# Patient Record
Sex: MALE | Race: WHITE | Employment: STUDENT | URBAN - METROPOLITAN AREA
[De-identification: names, ages, dates, MRNs, and addresses within clinical notes are randomized per-mention and may not be internally consistent; named-entity substitution may affect disease eponyms.]

---
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
## 2021-06-30 ENCOUNTER — OFFICE VISIT (OUTPATIENT)
Dept: FAMILY MEDICINE CLINIC | Facility: CLINIC | Age: 21
End: 2021-06-30
Payer: COMMERCIAL

## 2021-06-30 VITALS
RESPIRATION RATE: 18 BRPM | OXYGEN SATURATION: 99 % | SYSTOLIC BLOOD PRESSURE: 118 MMHG | TEMPERATURE: 98 F | HEIGHT: 70 IN | DIASTOLIC BLOOD PRESSURE: 82 MMHG | HEART RATE: 88 BPM | WEIGHT: 212 LBS | BODY MASS INDEX: 30.35 KG/M2

## 2021-06-30 DIAGNOSIS — Z00.00 ANNUAL PHYSICAL EXAM: Primary | ICD-10-CM

## 2021-06-30 DIAGNOSIS — D22.9 ATYPICAL MOLE: ICD-10-CM

## 2021-06-30 PROCEDURE — 80061 LIPID PANEL: CPT | Performed by: FAMILY MEDICINE

## 2021-06-30 PROCEDURE — 3074F SYST BP LT 130 MM HG: CPT | Performed by: FAMILY MEDICINE

## 2021-06-30 PROCEDURE — 85025 COMPLETE CBC W/AUTO DIFF WBC: CPT | Performed by: FAMILY MEDICINE

## 2021-06-30 PROCEDURE — 3008F BODY MASS INDEX DOCD: CPT | Performed by: FAMILY MEDICINE

## 2021-06-30 PROCEDURE — 3079F DIAST BP 80-89 MM HG: CPT | Performed by: FAMILY MEDICINE

## 2021-06-30 PROCEDURE — 84443 ASSAY THYROID STIM HORMONE: CPT | Performed by: FAMILY MEDICINE

## 2021-06-30 PROCEDURE — 99395 PREV VISIT EST AGE 18-39: CPT | Performed by: FAMILY MEDICINE

## 2021-06-30 PROCEDURE — 80053 COMPREHEN METABOLIC PANEL: CPT | Performed by: FAMILY MEDICINE

## 2021-06-30 NOTE — PATIENT INSTRUCTIONS
Exercise for a Healthier Heart     Exercise with a friend. When activity is fun, you're more likely to stick with it. You may wonder how you can improve the health of your heart. If you’re thinking about exercise, you’re on the right track.  You don’t n you enjoy. Walking is one of the easiest things you can do. You can also try swimming, riding a bike, dancing, or taking an exercise class.   Stop exercising and call your doctor if you:  · Have chest pain or feel dizzy or lightheaded  · Feel burning, tight molasses. Cut your usual amount by half. · Use non-sugar sweeteners. Stevia, aspartame, and sucralose can satisfy a sweet tooth without adding calories. · Swap out sugar-filled soda and other drinks. Buy sugar-free or low-calorie beverages.  Remember page your food with herbs and flavorings. Try lemon, garlic, and onion, or salt-free herb seasonings. · Limit convenience foods, such as boxed or canned foods and restaurant food. · Read food labels and choose lower-sodium options. Step 3.  Limit sugar  ·

## 2021-06-30 NOTE — PROGRESS NOTES
Patient presents with:  Physical       HPI:    Patient ID: Christine Dorado is a 23year old male. HPI   No concerns today. He has moles over his upper chest back and both upper extremity since many years. No specific change noted.     No medical illnes distension. Palpations: Abdomen is soft. Tenderness: There is no abdominal tenderness. There is no guarding or rebound. Skin:     General: Skin is warm. Capillary Refill: Capillary refill takes less than 2 seconds.       Comments: Multiple

## 2021-07-01 ENCOUNTER — TELEPHONE (OUTPATIENT)
Dept: FAMILY MEDICINE CLINIC | Facility: CLINIC | Age: 21
End: 2021-07-01

## 2021-07-01 NOTE — TELEPHONE ENCOUNTER
----- Message from Mecca Curtis MD sent at 6/30/2021  6:49 PM CDT -----  Labs look stable. Just one of his liver enz is elevated. I recommend if he can avoid alcohol and also try to lose weight with some diet and exercise.  We will recheck it in 6 months

## 2021-07-06 NOTE — TELEPHONE ENCOUNTER
Delivery Read From Message Type Attachments Subject   7/6/2021 10:42 AM Yanelis Khan RN Patient Medical Advice Request  test results

## 2021-07-24 ENCOUNTER — IMMUNIZATION (OUTPATIENT)
Dept: LAB | Facility: HOSPITAL | Age: 21
End: 2021-07-24
Attending: EMERGENCY MEDICINE
Payer: COMMERCIAL

## 2021-07-24 DIAGNOSIS — Z23 NEED FOR VACCINATION: Primary | ICD-10-CM

## 2021-07-24 PROCEDURE — 0001A SARSCOV2 VAC 30MCG/0.3ML IM: CPT

## 2021-08-14 ENCOUNTER — IMMUNIZATION (OUTPATIENT)
Dept: LAB | Facility: HOSPITAL | Age: 21
End: 2021-08-14
Attending: EMERGENCY MEDICINE
Payer: COMMERCIAL

## 2021-08-14 DIAGNOSIS — Z23 NEED FOR VACCINATION: Primary | ICD-10-CM

## 2021-08-14 PROCEDURE — 0002A SARSCOV2 VAC 30MCG/0.3ML IM: CPT

## 2022-01-04 ENCOUNTER — TELEPHONE (OUTPATIENT)
Dept: FAMILY MEDICINE CLINIC | Facility: CLINIC | Age: 22
End: 2022-01-04

## 2022-01-04 DIAGNOSIS — R74.8 ELEVATED LIVER ENZYMES: Primary | ICD-10-CM

## 2022-01-05 ENCOUNTER — TELEPHONE (OUTPATIENT)
Dept: FAMILY MEDICINE CLINIC | Facility: CLINIC | Age: 22
End: 2022-01-05

## 2022-04-13 ENCOUNTER — NURSE ONLY (OUTPATIENT)
Dept: FAMILY MEDICINE CLINIC | Facility: CLINIC | Age: 22
End: 2022-04-13
Payer: COMMERCIAL

## 2022-04-13 DIAGNOSIS — R74.8 ELEVATED LIVER ENZYMES: ICD-10-CM

## 2022-04-13 LAB
ALBUMIN SERPL-MCNC: 4.2 G/DL (ref 3.4–5)
ALP LIVER SERPL-CCNC: 123 U/L
ALT SERPL-CCNC: 96 U/L
AST SERPL-CCNC: 32 U/L (ref 15–37)
BILIRUB DIRECT SERPL-MCNC: 0.2 MG/DL (ref 0–0.2)
BILIRUB SERPL-MCNC: 0.6 MG/DL (ref 0.1–2)
PROT SERPL-MCNC: 7.4 G/DL (ref 6.4–8.2)

## 2022-04-13 PROCEDURE — 80076 HEPATIC FUNCTION PANEL: CPT | Performed by: FAMILY MEDICINE

## 2022-04-13 NOTE — PROGRESS NOTES
Sharifa Riojas presents today for nurse visit. Labs ordered by Dr Jesse Carrasquillo. 1 gold drawn from left ac area with 1 attempt with straight needle. Patient tolerated well. Left office in stable condition.

## 2022-04-14 ENCOUNTER — TELEPHONE (OUTPATIENT)
Dept: FAMILY MEDICINE CLINIC | Facility: CLINIC | Age: 22
End: 2022-04-14

## 2022-04-15 ENCOUNTER — PATIENT MESSAGE (OUTPATIENT)
Dept: FAMILY MEDICINE CLINIC | Facility: CLINIC | Age: 22
End: 2022-04-15

## 2023-11-28 ENCOUNTER — OFFICE VISIT (OUTPATIENT)
Dept: FAMILY MEDICINE CLINIC | Facility: CLINIC | Age: 23
End: 2023-11-28
Payer: COMMERCIAL

## 2023-11-28 VITALS
OXYGEN SATURATION: 98 % | TEMPERATURE: 98 F | WEIGHT: 231 LBS | HEART RATE: 116 BPM | HEIGHT: 70 IN | BODY MASS INDEX: 33.07 KG/M2 | RESPIRATION RATE: 18 BRPM | DIASTOLIC BLOOD PRESSURE: 80 MMHG | SYSTOLIC BLOOD PRESSURE: 110 MMHG

## 2023-11-28 DIAGNOSIS — J02.9 SORE THROAT: ICD-10-CM

## 2023-11-28 DIAGNOSIS — J02.0 STREP PHARYNGITIS: Primary | ICD-10-CM

## 2023-11-28 LAB
CONTROL LINE PRESENT WITH A CLEAR BACKGROUND (YES/NO): YES YES/NO
KIT LOT #: 6663 NUMERIC

## 2023-11-28 PROCEDURE — 3008F BODY MASS INDEX DOCD: CPT | Performed by: FAMILY MEDICINE

## 2023-11-28 PROCEDURE — 87880 STREP A ASSAY W/OPTIC: CPT | Performed by: FAMILY MEDICINE

## 2023-11-28 PROCEDURE — 3074F SYST BP LT 130 MM HG: CPT | Performed by: FAMILY MEDICINE

## 2023-11-28 PROCEDURE — 3079F DIAST BP 80-89 MM HG: CPT | Performed by: FAMILY MEDICINE

## 2023-11-28 PROCEDURE — 99213 OFFICE O/P EST LOW 20 MIN: CPT | Performed by: FAMILY MEDICINE

## 2023-11-28 RX ORDER — AMOXICILLIN AND CLAVULANATE POTASSIUM 875; 125 MG/1; MG/1
1 TABLET, FILM COATED ORAL 2 TIMES DAILY
Qty: 20 TABLET | Refills: 0 | Status: SHIPPED | OUTPATIENT
Start: 2023-11-28 | End: 2023-12-08

## 2025-02-10 ENCOUNTER — LAB ENCOUNTER (OUTPATIENT)
Dept: LAB | Age: 25
End: 2025-02-10
Payer: COMMERCIAL

## 2025-02-10 ENCOUNTER — OFFICE VISIT (OUTPATIENT)
Dept: FAMILY MEDICINE CLINIC | Facility: CLINIC | Age: 25
End: 2025-02-10
Payer: COMMERCIAL

## 2025-02-10 VITALS
RESPIRATION RATE: 16 BRPM | WEIGHT: 230.81 LBS | OXYGEN SATURATION: 98 % | TEMPERATURE: 98 F | DIASTOLIC BLOOD PRESSURE: 90 MMHG | HEIGHT: 70.28 IN | BODY MASS INDEX: 32.67 KG/M2 | SYSTOLIC BLOOD PRESSURE: 126 MMHG | HEART RATE: 75 BPM

## 2025-02-10 DIAGNOSIS — Z00.00 ANNUAL PHYSICAL EXAM: ICD-10-CM

## 2025-02-10 DIAGNOSIS — Z00.00 ANNUAL PHYSICAL EXAM: Primary | ICD-10-CM

## 2025-02-10 DIAGNOSIS — Z28.21 VACCINATION DECLINED: ICD-10-CM

## 2025-02-10 DIAGNOSIS — R00.2 HEART PALPITATIONS: ICD-10-CM

## 2025-02-10 LAB
ALBUMIN SERPL-MCNC: 4.9 G/DL (ref 3.2–4.8)
ALBUMIN/GLOB SERPL: 1.8 {RATIO} (ref 1–2)
ALP LIVER SERPL-CCNC: 94 U/L
ALT SERPL-CCNC: 94 U/L
ANION GAP SERPL CALC-SCNC: 7 MMOL/L (ref 0–18)
AST SERPL-CCNC: 41 U/L (ref ?–34)
BASOPHILS # BLD AUTO: 0.03 X10(3) UL (ref 0–0.2)
BASOPHILS NFR BLD AUTO: 0.6 %
BILIRUB SERPL-MCNC: 0.7 MG/DL (ref 0.3–1.2)
BUN BLD-MCNC: 11 MG/DL (ref 9–23)
CALCIUM BLD-MCNC: 10.1 MG/DL (ref 8.7–10.6)
CHLORIDE SERPL-SCNC: 106 MMOL/L (ref 98–112)
CHOLEST SERPL-MCNC: 176 MG/DL (ref ?–200)
CO2 SERPL-SCNC: 29 MMOL/L (ref 21–32)
CREAT BLD-MCNC: 1.08 MG/DL
EGFRCR SERPLBLD CKD-EPI 2021: 98 ML/MIN/1.73M2 (ref 60–?)
EOSINOPHIL # BLD AUTO: 0.05 X10(3) UL (ref 0–0.7)
EOSINOPHIL NFR BLD AUTO: 1 %
ERYTHROCYTE [DISTWIDTH] IN BLOOD BY AUTOMATED COUNT: 12.4 %
EST. AVERAGE GLUCOSE BLD GHB EST-MCNC: 94 MG/DL (ref 68–126)
FASTING PATIENT LIPID ANSWER: YES
FASTING STATUS PATIENT QL REPORTED: YES
GLOBULIN PLAS-MCNC: 2.7 G/DL (ref 2–3.5)
GLUCOSE BLD-MCNC: 92 MG/DL (ref 70–99)
HBA1C MFR BLD: 4.9 % (ref ?–5.7)
HCT VFR BLD AUTO: 51.2 %
HCV AB SERPL QL IA: NONREACTIVE
HDLC SERPL-MCNC: 42 MG/DL (ref 40–59)
HGB BLD-MCNC: 17.8 G/DL
IMM GRANULOCYTES # BLD AUTO: 0.02 X10(3) UL (ref 0–1)
IMM GRANULOCYTES NFR BLD: 0.4 %
LDLC SERPL CALC-MCNC: 115 MG/DL (ref ?–100)
LYMPHOCYTES # BLD AUTO: 1.82 X10(3) UL (ref 1–4)
LYMPHOCYTES NFR BLD AUTO: 37.1 %
MCH RBC QN AUTO: 31 PG (ref 26–34)
MCHC RBC AUTO-ENTMCNC: 34.8 G/DL (ref 31–37)
MCV RBC AUTO: 89 FL
MONOCYTES # BLD AUTO: 0.5 X10(3) UL (ref 0.1–1)
MONOCYTES NFR BLD AUTO: 10.2 %
NEUTROPHILS # BLD AUTO: 2.49 X10 (3) UL (ref 1.5–7.7)
NEUTROPHILS # BLD AUTO: 2.49 X10(3) UL (ref 1.5–7.7)
NEUTROPHILS NFR BLD AUTO: 50.7 %
NONHDLC SERPL-MCNC: 134 MG/DL (ref ?–130)
OSMOLALITY SERPL CALC.SUM OF ELEC: 293 MOSM/KG (ref 275–295)
PLATELET # BLD AUTO: 246 10(3)UL (ref 150–450)
POTASSIUM SERPL-SCNC: 4.5 MMOL/L (ref 3.5–5.1)
PROT SERPL-MCNC: 7.6 G/DL (ref 5.7–8.2)
RBC # BLD AUTO: 5.75 X10(6)UL
SODIUM SERPL-SCNC: 142 MMOL/L (ref 136–145)
TRIGL SERPL-MCNC: 104 MG/DL (ref 30–149)
TSI SER-ACNC: 1.52 UIU/ML (ref 0.55–4.78)
VLDLC SERPL CALC-MCNC: 18 MG/DL (ref 0–30)
WBC # BLD AUTO: 4.9 X10(3) UL (ref 4–11)

## 2025-02-10 PROCEDURE — 86803 HEPATITIS C AB TEST: CPT

## 2025-02-10 PROCEDURE — 84443 ASSAY THYROID STIM HORMONE: CPT

## 2025-02-10 PROCEDURE — 36415 COLL VENOUS BLD VENIPUNCTURE: CPT

## 2025-02-10 PROCEDURE — 80061 LIPID PANEL: CPT

## 2025-02-10 PROCEDURE — 85025 COMPLETE CBC W/AUTO DIFF WBC: CPT

## 2025-02-10 PROCEDURE — 83036 HEMOGLOBIN GLYCOSYLATED A1C: CPT

## 2025-02-10 PROCEDURE — 80053 COMPREHEN METABOLIC PANEL: CPT

## 2025-02-10 PROCEDURE — 87389 HIV-1 AG W/HIV-1&-2 AB AG IA: CPT

## 2025-02-10 NOTE — PATIENT INSTRUCTIONS
Obtain labs and holter monitor as discussed   Methods to manage anxiety including: journaling, exercise, meditation, and deep breathing techniques.  Monitor palpitations and return in 4 weeks to discuss further management

## 2025-02-10 NOTE — PROGRESS NOTES
Chief Complaint   Patient presents with    Physical     Palpitation on Wednesday           [facility]  Alberto Cabrera is a 24 year old male here for new patient visit and physical.    Last PSA: n/a    Last Colon Cancer screening: n/a    Acute concerns:  Pt reports a single episode of palpitations that occurred 5 days ago.  He reports feeling his heart beating hard and feeling it in his throat. The episode lasted approximately 5 minutes and was accompanied by dyspnea. The patient experienced chest soreness following the event, which resolved by the next day. He denies any prior similar episodes.    The patient acknowledges feeling stressed during the incident but cannot identify a specific trigger. He has no diagnosed anxiety disorder but reports experiencing anxiety. The patient believes he may have had panic attacks in the past.  He denies family hx of heart disease or cardiac deaths. GAD7: 4    Discussed:  - diet: calorie counting  - exercise: jogging   - sleep: adequate  - stress: work-related stress  - vision: UTD  - dentist visit: needs follow up  - STD screening: never done    ROS  As per HPI      Depression Screening (PHQ-2/PHQ-9): Over the LAST 2 WEEKS   Little interest or pleasure in doing things: Not at all    Feeling down, depressed, or hopeless: Not at all    PHQ-2 SCORE: 0          History reviewed. No pertinent past medical history.    Past Surgical History:   Procedure Laterality Date    Tonsillectomy      2005       Social History     Socioeconomic History    Marital status: Single   Tobacco Use    Smoking status: Never    Smokeless tobacco: Never   Substance and Sexual Activity    Alcohol use: Yes    Drug use: Not Currently       Family History   Problem Relation Age of Onset    Diabetes Father     Thyroid disease Sister         Medications Ordered Prior to Encounter[1]    Immunization History   Administered Date(s) Administered    Covid-19 Vaccine Pfizer 30 mcg/0.3 ml 07/24/2021, 08/14/2021    DTAP  01/12/2000, 04/06/2000, 07/19/2000, 02/21/2001, 08/19/2005    FLULAVAL 6 months & older 0.5 ml Prefilled syringe (10499) 11/16/2018    HEP A 08/30/2011, 08/28/2012    HEP B 01/12/2000, 07/19/2000, 02/21/2001    HIB 01/12/2000, 04/06/2000, 07/19/2000, 02/21/2001    IPV 01/12/2000, 04/06/2000, 12/06/2000, 08/19/2005    MMR 12/06/2000, 08/19/2005    Meningococcal-Menactra 08/30/2011, 09/13/2017    Pneumococcal (Prevnar 7) 02/21/2001, 06/20/2001    TD 08/30/2011    Varicella 12/06/2000, 08/30/2011           Objective  Vitals:    02/10/25 0900   BP: 126/90   Pulse: 75   Resp: 16   Temp: 97.9 °F (36.6 °C)   SpO2: 98%   Weight: 230 lb 12.8 oz (104.7 kg)   Height: 5' 10.28\" (1.785 m)   Body mass index is 32.86 kg/m².    Physical Exam  Constitutional:       Appearance: Normal appearance.   HEENT:      Head: Normocephalic and atraumatic.      Eyes: PERRLA no notable nystagmus     Ears: normal on observation     Nose: Nose normal.      Mouth: Mucous membranes are moist.      Neck: no lymphadenopathy  Cardiovascular:      Rate and Rhythm: Normal rate and regular rhythm.   Pulmonary:      Effort: Pulmonary effort is normal.      Breath sounds: Normal breath sounds.   Abdominal:      General: Bowel sounds are normal.      Palpations: Abdomen is soft. There is no mass.   Musculoskeletal:         General: Normal range of motion.    Skin:     General: Skin is warm and dry.   Neurological:      General: No focal deficit present.      Mental Status: Alert and oriented to person, place, and time.   Psychiatric:         Mood and Affect: Mood normal.         Thought Content: Thought content normal.     Assessment and Plan  Alberto was seen today for physical.    Diagnoses and all orders for this visit:    Annual physical exam  -     TSH W Reflex To Free T4; Future  -     Lipid Panel; Future  -     Comp Metabolic Panel (14); Future  -     CBC With Differential With Platelet; Future  -     HIV AG AB Combo [E]; Future  -     HCV Antibody  [E]; Future  -     Hemoglobin A1C [E]; Future    Heart palpitations  Comments:  EKG shows NSR with peaked T waves in V2,V3  CMP and Holter monitor ordered  Orders:  -     EKG with interpretation and Report -IN OFFICE [64072]  -     CARD ZIO EXTENDED MONITOR 3-7 DAYS (CPT=93242/33078); Future    Vaccination declined         Patient presents for annual exam  - General labs: ordered, awaiting results  - Discussed signing up for patient portal as means of communicating with me directly  - Discussed importance of communicating with me if has not heard back with results, etc  - Discussed age-appropriate vaccinations and screenings  - Pt verbalizes understanding, all questions/concerns addressed, in agreement w/plan    Follow up  Return in about 4 weeks (around 3/10/2025) for heart palpitations and anxiety.      Patient Instructions  Patient Instructions   Obtain labs and holter monitor as discussed   Methods to manage anxiety including: journaling, exercise, meditation, and deep breathing techniques.  Monitor palpitations and return in 4 weeks to discuss further management         Yary Rendon MD          [1]   Current Outpatient Medications on File Prior to Visit   Medication Sig Dispense Refill    Ergocalciferol (VITAMIN D OR) Take by mouth.       No current facility-administered medications on file prior to visit.

## 2025-02-11 ENCOUNTER — TELEPHONE (OUTPATIENT)
Dept: FAMILY MEDICINE CLINIC | Facility: CLINIC | Age: 25
End: 2025-02-11

## 2025-02-11 DIAGNOSIS — R74.8 ELEVATED LIVER ENZYMES: Primary | ICD-10-CM

## 2025-02-11 DIAGNOSIS — E78.00 ELEVATED LDL CHOLESTEROL LEVEL: ICD-10-CM

## 2025-02-11 PROBLEM — R00.2 HEART PALPITATIONS: Status: ACTIVE | Noted: 2025-02-11

## 2025-02-11 PROBLEM — D22.9 NUMEROUS MOLES: Status: ACTIVE | Noted: 2025-02-11

## 2025-02-11 NOTE — TELEPHONE ENCOUNTER
[employer] message sent to patient advising result note from   Last read by Alberto Cabrera at  3:28 PM on 2/11/2025.   Recall placed in epic to repeat blood work in 6 months

## 2025-02-11 NOTE — TELEPHONE ENCOUNTER
----- Message from Yary Rendon sent at 2/11/2025  3:27 PM CST -----  [employer] message sent:  CBC (blood count) is normal, shows no anemia  Hemoglobin A1C shows no diabetes or prediabetes  Thyroid function is normal  Hepatitis C and HIV are normal    Lipid panel shows elevated LDL (bad cholesterol). I recommend low fat diet and exercise    CMP (metabolic panel) shows elevated liver enzymes. I recommend  weight loss, low fat diet and exercise.      Repeat labs in 6 months

## 2025-03-14 ENCOUNTER — TELEPHONE (OUTPATIENT)
Dept: FAMILY MEDICINE CLINIC | Facility: CLINIC | Age: 25
End: 2025-03-14

## 2025-08-11 ENCOUNTER — TELEPHONE (OUTPATIENT)
Dept: FAMILY MEDICINE CLINIC | Facility: CLINIC | Age: 25
End: 2025-08-11

## (undated) NOTE — LETTER
06/30/18        [address] [place] [id number]      Dear Elise Oakley,    [facility] records indicate that you have outstanding lab work and or testing that was ordered for you and has not yet been completed:  Lab Frequency Next Occurrence

## (undated) NOTE — LETTER
02/03/22        [address] [place] [id number]      Dear Sherren Loring,    [facility] records indicate that you have outstanding lab work that was ordered for you and has not yet been completed:        Hepatic Function Panel (7) [E]    To pro